# Patient Record
Sex: FEMALE | Race: OTHER | NOT HISPANIC OR LATINO | URBAN - METROPOLITAN AREA
[De-identification: names, ages, dates, MRNs, and addresses within clinical notes are randomized per-mention and may not be internally consistent; named-entity substitution may affect disease eponyms.]

---

## 2022-04-05 ENCOUNTER — EMERGENCY (EMERGENCY)
Facility: HOSPITAL | Age: 67
LOS: 1 days | Discharge: HOME | End: 2022-04-05
Attending: EMERGENCY MEDICINE | Admitting: INTERNAL MEDICINE
Payer: MEDICAID

## 2022-04-05 VITALS
WEIGHT: 132.28 LBS | RESPIRATION RATE: 16 BRPM | SYSTOLIC BLOOD PRESSURE: 118 MMHG | DIASTOLIC BLOOD PRESSURE: 78 MMHG | OXYGEN SATURATION: 96 % | HEART RATE: 78 BPM | HEIGHT: 63 IN | TEMPERATURE: 98 F

## 2022-04-05 PROCEDURE — 99284 EMERGENCY DEPT VISIT MOD MDM: CPT

## 2022-04-05 PROCEDURE — 73562 X-RAY EXAM OF KNEE 3: CPT | Mod: 26,LT

## 2022-04-05 PROCEDURE — 73562 X-RAY EXAM OF KNEE 3: CPT

## 2022-04-05 PROCEDURE — 99283 EMERGENCY DEPT VISIT LOW MDM: CPT | Mod: 25

## 2022-04-05 RX ORDER — IBUPROFEN 200 MG
400 TABLET ORAL ONCE
Refills: 0 | Status: COMPLETED | OUTPATIENT
Start: 2022-04-05 | End: 2022-04-05

## 2022-04-05 RX ADMIN — Medication 400 MILLIGRAM(S): at 11:38

## 2022-04-05 NOTE — ED PROVIDER NOTE - OBJECTIVE STATEMENT
Dr. De Jesus: 66F Mongolian speaking, declined , wants daughter at bedside to translate, sustained a mechanical fall at home on 5 hardwood steps, held on to banister to break her fall, did not hit her head, no LOC, twisted left knee, able to ambulate but with pain. No other injuries. Recd motrin in the ED with some relief.

## 2022-04-05 NOTE — ED PROVIDER NOTE - NSFOLLOWUPCLINICS_GEN_ALL_ED_FT
[facility] Orthopedic Surgery  Orthopedic Surgery  [address], 3rd & 4th [place] [address]  Phone: [phone]  Fax:

## 2022-04-05 NOTE — ED PROVIDER NOTE - CLINICAL SUMMARY MEDICAL DECISION MAKING FREE TEXT BOX
Pt with left knee pain, likely sprain, given knee immobilizer, ace wrap, pain ctrl, dc with ortho f/u Pt with left knee pain, likely sprain, given knee immobilizer, ace wrap, pain ctrl, dc with ortho f/u    Appt made for  April 27th at 9am  [facility] at [address]

## 2022-04-05 NOTE — ED ADULT NURSE NOTE - NSIMPLEMENTINTERV_GEN_ALL_ED
Implemented All Fall Risk Interventions:  [place] to call system. Call bell, personal items and telephone within reach. Instruct patient to call for assistance. Room bathroom lighting operational. Non-slip footwear when patient is off stretcher. Physically safe environment: no spills, clutter or unnecessary equipment. Stretcher in lowest position, wheels locked, appropriate side rails in place. Provide visual cue, wrist band, yellow gown, etc. Monitor gait and stability. Monitor for mental status changes and reorient to person, place, and time. Review medications for side effects contributing to fall risk. Reinforce activity limits and safety measures with patient and family.

## 2022-04-05 NOTE — ED PROVIDER NOTE - PATIENT PORTAL LINK FT
You can access the FollowMyHealth Patient Portal offered by [facility] by registering at the following website: [email or web address]. By joining [employer]’s FollowMyHealth portal, you will also be able to view your health information using other applications (apps) compatible with our system.

## 2022-04-05 NOTE — CHART NOTE - NSCHARTNOTEFT_GEN_A_CORE
Pt presenting to  ED on 4/5 s/p a fall down the stairs. Pt is Icelandic speaking only. Pt's dtr Neelam at bedside who Pt prefers to translate. Pt refusing  services.   Pt lives at home with her dtr Neelam. Pt reports they ambulate and perform all ADLs independently without assistive devices. Pt has no hx of falls and does not utilize home care services.   Pt tripped and fell down stairs today resulting in L knee pain. Pt's XRAYS performed in ED are clear. Pt to be dc home with knee immobilizer and crutches. Pt's dtr to assist at home.   Pt is undocumented and uninsured. Pt does not have any outpatient medical care in place. Pt's dtr states Pt was signed up for Emergency Medicaid services. DAWSON informed dtr that Emergency Medicaid will cover inpatient hospital care, but will not provide coverage for outpatient medical appointments. SW discussed the option of visiting a sliding scale clinic for lower cost private pay care. Pt's dtr understanding and agreeable.   DAWSON scheduled a new Pt PCP appointment at [facility] for Friday, April 8, at 1pm.  FP informed SW that Pt will need to bring a photo ID and the appointment will be $5. DAWSON provided information to Pt and dtr. Pt and dtr understanding.  DAWSON scheduled an orthopedic follow-up appointment at [facility] at [place] for April 27th at 9am, [address], 3rd floor, [facility]. Pt and dtr agreeable to appointment date and time.  DAWSON provided Pt and dtr with financial assistance number of [phone] and directed dtr to call prior to Orthopedic appointment to be screened for sliding scale rate. Pt's dtr understanding and states she will call prior to appointment.   Pt to be transported home by dtr

## 2022-04-05 NOTE — ED PROVIDER NOTE - NS ED ATTENDING STATEMENT MOD
This was a shared visit with the JENELLE. I reviewed and verified the documentation and independently performed the documented:

## 2022-04-05 NOTE — ED ADULT NURSE NOTE - OBJECTIVE STATEMENT
Pt presents to ED from home with daughter for left knee pain. Pt states she fell down 5 steps and landed on her wrists and knees. She c/o left knee pain and difficulty ambulating. No obvious deformity.

## 2022-04-05 NOTE — ED PROVIDER NOTE - CARE PROVIDER_API CALL
Jatin Covington)  Orthopaedic Surgery  [address]  [place] [address]  Phone: [phone]  Fax: [phone]  Follow Up Time:

## 2022-04-05 NOTE — ED PROVIDER NOTE - ATTENDING CONTRIBUTION TO CARE
Dr. De Jesus: I performed a face to face bedside interview with patient regarding history of present illness, review of symptoms and past medical history. I completed an independent physical exam.  I have discussed patient's plan of care with PA.   I agree with note as stated above, having amended the EMR as needed to reflect my findings.   This includes HISTORY OF PRESENT ILLNESS, HIV, PAST MEDICAL/SURGICAL/FAMILY/SOCIAL HISTORY, ALLERGIES AND HOME MEDICATIONS, REVIEW OF SYSTEMS, PHYSICAL EXAM, and any PROGRESS NOTES during the time I functioned as the attending physician for this patient.    Dr. De Jesus: This H&P has been written by myself in its entirety

## 2022-04-05 NOTE — ED PROVIDER NOTE - NSFOLLOWUPINSTRUCTIONS_ED_ALL_ED_FT
-Motrin 400mg every 6 hours on a full stomach as needed for pain  -Follow up with ortho in 1-2 days  -Knee immobilizer during the day for comfort. You can also use ace wrap  -Return to the ED for worsening pain or any concerns      Knee Sprain, Adult       A knee sprain is a stretch or tear in a knee ligament. Knee ligaments are tissues that connect bones in the knee to each other.      What are the causes?    This condition often results from:  •A fall.      •An injury to the knee.        What are the signs or symptoms?    Symptoms of this condition include:  •Trouble straightening or bending the leg.      •Swelling in the knee.      •Bruising around the knee.      •Tenderness or pain in the knee.      •Muscle spasms around the knee.        How is this diagnosed?    This condition may be diagnosed based on:  •A physical exam.      •A history of what happened just before you started to have symptoms.    •Tests, including:  •An X-ray. This may be done to make sure no bones are broken.      •An MRI. This may be done to check if the ligament is torn.      •Stress testing of the knee. This may be done to check ligament damage.          How is this treated?    Treatment for this condition may involve:  •Keeping the knee still (immobilized) with a cast, brace, or splint.      •Applying ice to the knee. This helps with pain and swelling.      •Raising (elevating) the knee above the level of your heart when you are resting. This helps with pain and swelling.      •Taking medicine for pain.      •Doing exercises to prevent or limit permanent weakness or stiffness in your knee.      •Having surgery to reconnect the ligament to the bone or to reconstruct it. This may be needed if the ligament is completely torn.        Follow these instructions at home:    If you have a splint or brace:     •Wear it as told by your health care provider. Remove it only as told by your health care provider.      •Check the skin around it every day. Tell your health care provider about any concerns.      •Loosen it if your toes tingle, become numb, or turn cold and blue.      •Keep it clean and dry.      If you have a cast:     • Do not stick anything inside it to scratch your skin. Doing that increases your risk of infection.      •Check the skin around it every day. Tell your health care provider about any concerns.      •You may put lotion on dry skin around the edges of the cast. Do not put lotion on the skin underneath the cast.      •Keep it clean and dry.      Bathing     If you have a splint, brace, or cast that is not waterproof:  •Do not let it get wet.      •Cover it with a watertight covering when you take a bath or a shower.        Managing pain, stiffness, and swelling  •If directed, put ice on the injured area. To do this:  •If you have a removable splint or brace, remove it as told by your health care provider.      •Put ice in a plastic bag.      •Place a towel between your skin and the bag or between your cast and the bag.      •Leave the ice on for 20 minutes, 2–3 times a day.        •Move your toes often to reduce stiffness and swelling.      •Elevate the injured area above the level of your heart while you are sitting or lying down.      General instructions    •Take over-the-counter and prescription medicines only as told by your health care provider.      •Do not use any products that contain nicotine or tobacco, such as cigarettes, e-cigarettes, and chewing tobacco. These can delay healing. If you need help quitting, ask your health care provider.      •Do exercises as told by your health care provider.      •Keep all follow-up visits as told by your health care provider. This is important.        Contact a health care provider if:    •You have pain that gets worse.      •The cast, brace, or splint does not fit right.      •The cast, brace, or splint gets damaged.        Get help right away if:    •You cannot use your injured knee to support any of your body weight (cannot bear weight).      •You cannot move the injured joint.      •You cannot walk more than a few steps without pain or without your knee buckling.      •You have significant pain, swelling, or numbness in the leg below the cast, brace, or splint.      •Your foot or toes are numb, cold, or blue after loosening your splint or brace.        Summary    •A knee sprain is a stretch or tear in a knee ligament that usually occurs as the result of a fall or injury.      •Treatment may involve immobilizing the knee with a cast, splint, or brace and then doing exercises.      •If the ligament is completely torn, it may require surgery to repair or replace the injured ligament.      This information is not intended to replace advice given to you by your health care provider. Make sure you discuss any questions you have with your health care provider.    *****  Fall Prevention for Older Adults    WHAT YOU NEED TO KNOW:    As you age, your muscles weaken and your risk for falls increases. Your risk also increases if you take medicines that make you sleepy or dizzy. You may also be at risk if you have vision or joint problems, have low blood pressure, or are not active.    DISCHARGE INSTRUCTIONS:    Call 911 or have someone else call if:   •You have fallen and are unconscious.      •You have fallen and cannot move part of your body.      Contact your healthcare provider if:   •You have fallen and have pain or a headache.      •You have questions or concerns about your condition or care.      Fall prevention tips:   •Stay active. Exercise can help strengthen your muscles and improve your balance. Your healthcare provider may recommend water aerobics, walking, or Neil Chi. He or she may also recommend physical therapy to improve your coordination. Never start an exercise program without asking your healthcare provider first.  Water Aerobics for Seniors       Neil Chi for Seniors           •Wear shoes that fit well and have soles that . Wear shoes both inside and outside. Use slippers with good . Avoid shoes with high heels.      •Use assistive devices as directed. Your healthcare provider may suggest that you use a cane or walker to help you keep your balance. You may need to have grab bars put in your bathroom near the toilet or in the shower.      •Stand or sit up slowly. This may help you keep your balance and prevent falls.      •Wear a personal alarm. This is a device that allows you to call 911 if you need help. Ask for more information on personal alarms.      •Manage your medical conditions.  Keep all appointments with your healthcare providers. Visit your eye doctor as directed.      Home safety tips:     Fall Prevention for Seniors     •Add items to prevent falls in the bathroom. Put nonslip strips on your bath or shower floor to prevent you from slipping. Use a bath mat if you do not have carpet in the bathroom. This will prevent you from falling when you step out of the bath or shower. Use a shower seat so you do not need to stand while you shower. Sit on the toilet or a chair in your bathroom to dry yourself and put on clothing. This will prevent you from losing your balance from drying or dressing yourself while you are standing.      •Keep paths clear. Remove books, shoes, and other objects from walkways and stairs. Place cords for telephones and lamps out of the way so that you do not need to walk over them. Tape them down if you cannot move them. Remove small rugs. If you cannot remove a rug, secure it with double-sided tape. This will prevent you from tripping.      •Install bright lights in your home. Use night lights to help light paths to the bathroom or kitchen. Always turn on the light before you start walking.      •Keep items you use often on shelves within reach. Do not use a step stool to help you reach an item.      •Paint or place reflective tape on the edges of your stairs. This will help you see the stairs better.      Follow up with your healthcare provider as directed: Write down your questions so you remember to ask them during your visits.

## 2022-04-06 PROBLEM — Z00.00 ENCOUNTER FOR PREVENTIVE HEALTH EXAMINATION: Status: ACTIVE | Noted: 2022-04-06

## 2022-04-07 PROBLEM — Z78.9 OTHER SPECIFIED HEALTH STATUS: Chronic | Status: ACTIVE | Noted: 2022-04-05

## 2022-04-08 ENCOUNTER — APPOINTMENT (OUTPATIENT)
Dept: FAMILY MEDICINE | Facility: HOSPITAL | Age: 67
End: 2022-04-08

## 2022-04-08 NOTE — HISTORY OF PRESENT ILLNESS
[FreeTextEntry8] :  65 yo F presents after being discharged from ED on 4/5/22, she presented after sustaining a mechanical fall at home on 5 hardwood steps, held on to banister to break fall, did not hit her head, no LOC, twisted L knee. Knee immobilizer was placed. \par

## 2022-04-12 NOTE — CHART NOTE - NSCHARTNOTEFT_GEN_A_CORE
SW placed call to patient to discuss and assist with follow up care.  Patient presented to ED on 4/5/22 with complaint of fall down stairs. Pt did not answer. SW left a voicemail encouraging Pt to call with further questions and/or follow-up needs.

## 2022-04-26 ENCOUNTER — APPOINTMENT (OUTPATIENT)
Dept: FAMILY MEDICINE | Facility: HOSPITAL | Age: 67
End: 2022-04-26

## 2022-04-26 ENCOUNTER — OUTPATIENT (OUTPATIENT)
Dept: OUTPATIENT SERVICES | Facility: HOSPITAL | Age: 67
LOS: 1 days | End: 2022-04-26
Payer: SELF-PAY

## 2022-04-26 VITALS
WEIGHT: 139 LBS | OXYGEN SATURATION: 99 % | HEIGHT: 64 IN | BODY MASS INDEX: 23.73 KG/M2 | DIASTOLIC BLOOD PRESSURE: 85 MMHG | RESPIRATION RATE: 12 BRPM | TEMPERATURE: 98.6 F | HEART RATE: 74 BPM | SYSTOLIC BLOOD PRESSURE: 125 MMHG

## 2022-04-26 DIAGNOSIS — Z00.00 ENCOUNTER FOR GENERAL ADULT MEDICAL EXAMINATION WITHOUT ABNORMAL FINDINGS: ICD-10-CM

## 2022-04-26 DIAGNOSIS — M25.50 PAIN IN UNSPECIFIED JOINT: ICD-10-CM
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
## 2022-04-26 PROCEDURE — G0463: CPT

## 2022-04-26 RX ORDER — DICLOFENAC SODIUM 1% 10 MG/G
1 GEL TOPICAL DAILY
Qty: 1 | Refills: 0 | Status: ACTIVE | COMMUNITY
Start: 2022-04-26 | End: 1900-01-01

## 2022-04-27 ENCOUNTER — APPOINTMENT (OUTPATIENT)
Dept: ORTHOPEDIC SURGERY | Facility: HOSPITAL | Age: 67
End: 2022-04-27

## 2022-04-27 ENCOUNTER — OUTPATIENT (OUTPATIENT)
Dept: OUTPATIENT SERVICES | Facility: HOSPITAL | Age: 67
LOS: 1 days | End: 2022-04-27
Payer: SELF-PAY

## 2022-04-27 VITALS
RESPIRATION RATE: 14 BRPM | TEMPERATURE: 98.6 F | DIASTOLIC BLOOD PRESSURE: 87 MMHG | SYSTOLIC BLOOD PRESSURE: 148 MMHG | BODY MASS INDEX: 22.21 KG/M2 | HEART RATE: 70 BPM | HEIGHT: 64 IN | WEIGHT: 130.07 LBS

## 2022-04-27 DIAGNOSIS — M25.562 PAIN IN LEFT KNEE: ICD-10-CM

## 2022-04-27 DIAGNOSIS — M79.609 PAIN IN UNSPECIFIED LIMB: ICD-10-CM

## 2022-04-27 PROCEDURE — G0463: CPT

## 2022-04-27 RX ORDER — MELOXICAM 15 MG/1
15 TABLET ORAL DAILY
Qty: 30 | Refills: 0 | Status: ACTIVE | COMMUNITY
Start: 2022-04-27 | End: 1900-01-01

## 2022-04-27 RX ORDER — MELOXICAM 15 MG/1
1 TABLET ORAL
Qty: 30 | Refills: 0
Start: 2022-04-27 | End: 2022-05-26

## 2022-04-27 NOTE — PHYSICAL EXAM
[Normal] : no acute distress, well nourished, well developed and well-appearing [de-identified] : TTP of medial L knee, antalgic gait noted, walks w/o flexing L knee. Pain worse w valgus stress test

## 2022-04-27 NOTE — HISTORY OF PRESENT ILLNESS
[Other: ______] : provided by KADY [FreeTextEntry8] : Pt is a 67 yo Turkmen speaking F presenting for post-ED F/U. \par -Pt presented to ED in beginning of April after a mechanical fall down 5 steps, states she held on to railing, did not hit head, twisted L knee medially on way down. In ED, PE notable for minimal L knee effusion and TTP over medial L knee. X-ray obtained in ED demonstrated no fracture or bony pathology. Pt was given a knee immobilizer and told to F/U in clinic and given an Ortho F/U. Pt has appt w Ortho tomorrow 04/27. \par -Today, states improvement of L knee pain, uses ibuprofen sparingly. Admits to not using knee immobilizer, states oversized and difficult to get into car w it. Avoids flexing knee due to pain. Does not use a walking device at baseline, states she has never required one, states slipped down stairs.  [Interpreters_IDNumber] : [id number] [TWNoteComboBox1] : Palestinian

## 2022-04-27 NOTE — REVIEW OF SYSTEMS
[Joint Pain] : joint pain [Negative] : Constitutional [Joint Stiffness] : no joint stiffness [Joint Swelling] : no joint swelling [Muscle Weakness] : no muscle weakness [Muscle Pain] : no muscle pain [Back Pain] : no back pain

## 2022-04-29 ENCOUNTER — APPOINTMENT (OUTPATIENT)
Dept: MRI IMAGING | Facility: HOSPITAL | Age: 67
End: 2022-04-29

## 2022-04-29 ENCOUNTER — OUTPATIENT (OUTPATIENT)
Dept: OUTPATIENT SERVICES | Facility: HOSPITAL | Age: 67
LOS: 1 days | End: 2022-04-29
Payer: SELF-PAY

## 2022-04-29 DIAGNOSIS — M25.562 PAIN IN LEFT KNEE: ICD-10-CM

## 2022-04-29 PROCEDURE — 73721 MRI JNT OF LWR EXTRE W/O DYE: CPT

## 2022-05-24 ENCOUNTER — APPOINTMENT (OUTPATIENT)
Dept: FAMILY MEDICINE | Facility: HOSPITAL | Age: 67
End: 2022-05-24

## 2022-05-25 ENCOUNTER — APPOINTMENT (OUTPATIENT)
Dept: ORTHOPEDIC SURGERY | Facility: HOSPITAL | Age: 67
End: 2022-05-25

## 2022-05-25 ENCOUNTER — OUTPATIENT (OUTPATIENT)
Dept: OUTPATIENT SERVICES | Facility: HOSPITAL | Age: 67
LOS: 1 days | End: 2022-05-25
Payer: SELF-PAY

## 2022-05-25 VITALS
HEART RATE: 72 BPM | TEMPERATURE: 96.8 F | DIASTOLIC BLOOD PRESSURE: 86 MMHG | BODY MASS INDEX: 22.36 KG/M2 | SYSTOLIC BLOOD PRESSURE: 134 MMHG | HEIGHT: 64 IN | WEIGHT: 131 LBS

## 2022-05-25 DIAGNOSIS — M79.609 PAIN IN UNSPECIFIED LIMB: ICD-10-CM

## 2022-05-25 PROCEDURE — G0463: CPT

## 2022-05-25 RX ORDER — MELOXICAM 15 MG/1
15 TABLET ORAL DAILY
Qty: 30 | Refills: 1 | Status: ACTIVE | COMMUNITY
Start: 2022-05-25 | End: 1900-01-01

## 2022-05-26 DIAGNOSIS — M23.322 OTHER MENISCUS DERANGEMENTS, POSTERIOR HORN OF MEDIAL MENISCUS, LEFT KNEE: ICD-10-CM

## 2022-05-26 DIAGNOSIS — M17.12 UNILATERAL PRIMARY OSTEOARTHRITIS, LEFT KNEE: ICD-10-CM

## 2022-07-13 ENCOUNTER — APPOINTMENT (OUTPATIENT)
Dept: ORTHOPEDIC SURGERY | Facility: HOSPITAL | Age: 67
End: 2022-07-13

## 2022-07-13 ENCOUNTER — OUTPATIENT (OUTPATIENT)
Dept: OUTPATIENT SERVICES | Facility: HOSPITAL | Age: 67
LOS: 1 days | End: 2022-07-13
Payer: SELF-PAY

## 2022-07-13 VITALS
BODY MASS INDEX: 22.36 KG/M2 | HEART RATE: 56 BPM | HEIGHT: 64 IN | RESPIRATION RATE: 14 BRPM | SYSTOLIC BLOOD PRESSURE: 137 MMHG | TEMPERATURE: 97.1 F | WEIGHT: 131 LBS | DIASTOLIC BLOOD PRESSURE: 85 MMHG

## 2022-07-13 DIAGNOSIS — M25.50 PAIN IN UNSPECIFIED JOINT: ICD-10-CM

## 2022-07-13 DIAGNOSIS — M23.322 OTHER MENISCUS DERANGEMENTS, POSTERIOR HORN OF MEDIAL MENISCUS, LEFT KNEE: ICD-10-CM

## 2022-07-13 DIAGNOSIS — M17.12 UNILATERAL PRIMARY OSTEOARTHRITIS, LEFT KNEE: ICD-10-CM

## 2022-07-13 PROCEDURE — G0463: CPT

## 2022-07-15 DIAGNOSIS — M23.322 OTHER MENISCUS DERANGEMENTS, POSTERIOR HORN OF MEDIAL MENISCUS, LEFT KNEE: ICD-10-CM

## 2022-07-15 DIAGNOSIS — M17.12 UNILATERAL PRIMARY OSTEOARTHRITIS, LEFT KNEE: ICD-10-CM

## 2022-10-12 ENCOUNTER — APPOINTMENT (OUTPATIENT)
Dept: ORTHOPEDIC SURGERY | Facility: HOSPITAL | Age: 67
End: 2022-10-12

## 2022-12-19 ENCOUNTER — EMERGENCY (EMERGENCY)
Facility: HOSPITAL | Age: 67
LOS: 1 days | Discharge: HOME | End: 2022-12-19
Attending: EMERGENCY MEDICINE | Admitting: EMERGENCY MEDICINE
Payer: MEDICAID

## 2022-12-19 VITALS
HEART RATE: 73 BPM | WEIGHT: 132.28 LBS | RESPIRATION RATE: 15 BRPM | DIASTOLIC BLOOD PRESSURE: 87 MMHG | HEIGHT: 63.78 IN | OXYGEN SATURATION: 94 % | TEMPERATURE: 97 F | SYSTOLIC BLOOD PRESSURE: 146 MMHG

## 2022-12-19 PROCEDURE — 99284 EMERGENCY DEPT VISIT MOD MDM: CPT

## 2022-12-19 PROCEDURE — 99283 EMERGENCY DEPT VISIT LOW MDM: CPT

## 2022-12-19 NOTE — ED ADULT NURSE NOTE - NSFALLRSKINDICATORS_ED_ALL_ED
Nurse Navigation called [facility] today to see if the patient has been established with Dr. Boyer.  The  stated that they have attempted to contact the patient over the last 3 months 3 times and have left messages on the number listed at [phone] with no return call back from the patient.  Nurse Navigator has attempted to connect wit  Agnieszka from home health she is scheduled to see patient today.  
no

## 2022-12-19 NOTE — CHART NOTE - NSCHARTNOTEFT_GEN_A_CORE
SW met with patient at bedside to assess for social work needs and to complete home assessment of safety.  Patient is a 67 year old female presenting to ED with complaint of cough. Patients daughter at bedside, patient granted permission for daughter to translate.  Daughter reports that patient lives at home with her and family.  Denied any safety concerns or need for SW resources at this time. ISAR negative - score of 0.  SW spoke with patient and daughter regarding follow up appointment with PMD, SW offered to schedule.  Patient in agreement.  SW called [facility] [phone] and scheduled patient follow up for 12/22/22 at 9 AM.  Patient made aware that she will need to get COVID test prior to entering (FP will provide test), daughter verbalized understanding.  Patient confirms having transportation home.  SW to remain available as needed.

## 2022-12-19 NOTE — ED PROVIDER NOTE - CLINICAL SUMMARY MEDICAL DECISION MAKING FREE TEXT BOX
67-year-old female no pertinent past medical history presents to the ED saying that she had flu.  However she has a lingering cough.  No more fever or body aches.  Patient would like a medication for the cough or an antibiotic.  No vomiting.  Cough is dry.  No shortness of breath.  No chest pain. Daughter is granted permission to translate by patient..  Exam within normal limits.  No acute findings on exam or concerns.  However note that patient's O2 sat at 94%.  Afebrile with normal heart rate and blood pressure.  Advised patient to get an x-ray and offered to send a swab for flu, COVID, RSV.  Patient refused x-ray.  She refused swab as well.  She had never been tested but she knew that she had the flu because her grandson had the same.  Patient only wants a medicine for the cough and an antibiotic.  Since patient is certain that she has something viral and her symptoms are improving and that she no longer has body aches or fever, it likely was viral and I do not recommend an antibiotic.  Will prescribe a medicine for the cough.  All questions answered.  Recommend follow-up with a primary care doctor if no improvement.  Return to the ER if there is worsening

## 2022-12-19 NOTE — ED PROVIDER NOTE - NSFOLLOWUPINSTRUCTIONS_ED_ALL_ED_FT
Please follow-up with your primary care doctor if there is no improvement.  You can call [phone] to help with setting up an appointment if you do not have a doctor.  We have prescribed benzonatate or Tessalon Perles 100 mg every 8 hours as needed for cough.  Return to the ER if there is trouble breathing chest pain or worsening of symptoms.

## 2022-12-19 NOTE — ED PROVIDER NOTE - PHYSICAL EXAMINATION
General:     NAD, well-nourished, well-appearing  Eyes: PERRL  Head:     NC/AT, EOMI, oral mucosa moist  Neck:     trachea midline  Lungs:     CTA b/l  CVS:     RRR  Abd:     +BS, s/nt/nd  Ext:   no deformities   Neuro: AAOx3, no sensory/motor deficits  Skin: No rash or injuries noted

## 2022-12-19 NOTE — ED PROVIDER NOTE - PATIENT PORTAL LINK FT
You can access the FollowMyHealth Patient Portal offered by [facility] by registering at the following website: [email or web address]. By joining [employer]’s FollowMyHealth portal, you will also be able to view your health information using other applications (apps) compatible with our system.

## 2022-12-19 NOTE — ED PROVIDER NOTE - OBJECTIVE STATEMENT
67-year-old female no pertinent past medical history presents to the ED saying that she had flu.  However she has a lingering cough.  No more fever or body aches.  Patient would like a medication for the cough or an antibiotic.  No vomiting.  Cough is dry.  No shortness of breath.  No chest pain. Daughter is granted permission to translate by patient.

## 2022-12-19 NOTE — ED ADULT NURSE NOTE - NS ED NURSE LEVEL OF CONSCIOUSNESS SPEECH
Ambulatory Care Coordination Note  6/3/2021  CM Risk Score: 2  Charlson 10 Year Mortality Risk Score: 98%     ACC: Milla Mendez RN    Summary Note:   Spoke with patient for [facility] ER f/u. Identified patient by name and . Oriented patient to the role of ACM. Patient is agreeable to enrollment. Patient reports that she is SOB on exertion, weak and has continued cough yielding dark, \"streaked\" blood tinged sputum. Patient reports slight decrease in cough, less abdominal pain than when she went to the ER. Patient denies CP, dizziness, fever, light headedness, palpitations or increased cough. Denies recent fall or injury; bloody sputum less than one teaspoon. Denies fever, chills, N/V/D or worsening symptoms. Instructed on worsening s/s  to report to MD. DOVE to update provider in r/t status. Reviewed medications. Confirmed that patient has Vibra Tabs/ prednisone and is taking them as directed. Instructed patient to complete the entire course of her antibiotics. Patient verbalized understanding. Patient reports that she does struggle with the cost of her medication co-pays. Agreeable to referral for [facility] support. Discussed the benefits of [address] for safety/ medication/ disease management/  RN on call for changes in condition. Patient reports that she would benefit from [address] support at this time. Patient reports that her grand son is staying with her to assist with her care as needed. Reports that her daughter is also supportive. Denies any transportation barriers. Patient denies any concerns at this time. ACM contact information provided should needs arise. Spoke with PCP office/ MA. Updated in r/t patient status/ request for [address]. F/u appt. Scheduled 21 @ 08:45. Updated Sophie/ [address] [place] [address] Rd in r/t potential referral.     Follow up contact with patient to confirm appointment date and time. Instructed on the [facility] as a bridge to Primary care for urgent care needs.   Reviewed s/s to report to [facility]. Plan for next outreach:  [id number] [address]    Ambulatory Care Coordination Assessment    Care Coordination Protocol  Program Enrollment: Complex Care  Referral from Primary Care Provider: No  Week 1 - Initial Assessment     Do you have all of your prescriptions and are they filled?: Yes  Barriers to medication adherence: Other  Other barriers to medication adherence: Overwhelmed at times  Are you able to afford your medications?: With Assistance  Medication Assistance Program: [facility] Meds/Vouchers  How often do you have trouble taking your medications the way you have been told to take them?: I always take them as prescribed. Do you have Home O2 Therapy?: No      Ability to seek help/take action for Emergent Urgent situations i.e. fire, crime, inclement weather or health crisis. : Independent  Ability to ambulate to restroom: Independent  Ability handle personal hygeine needs (bathing/dressing/grooming): Independent  Ability to manage Medications: Independent  Ability to prepare Food Preparation: Independent  Ability to maintain home (clean home, laundry): Needs Assistance  Ability to drive and/or has transportation: Independent  Ability to do shopping: Needs Assistance  Ability to manage finances:  Independent  Is patient able to live independently?: Yes     Current Housing: Private Residence        Per the Fall Risk Screening, did the patient have 2 or more falls or 1 fall with injury in the past year?: No     Frequent urination at night?: Yes  Do you use rails/bars?: No  Do you have a non-slip tub mat?: Yes     Are you experiencing loss of meaning?: No  Are you experiencing loss of hope and peace?: No     Thinking about your patient's physical health needs, are there any symptoms or problems (risk indicators) you are unsure about that require further investigation?: Mild vague physical symptoms or problems; but do not impact on daily life or are not of concern to patient   Are the patients physical health problems impacting on their mental well-being?: No identified areas of concern   Are there any problems with your patients lifestyle behaviors (alcohol, drugs, diet, exercise) that are impacting on physical or mental well-being?: No identified areas of concern   Do you have any other concerns about your patients mental well-being? How would you rate their severity and impact on the patient?: No identified areas of concern   How would you rate their home environment in terms of safety and stability (including domestic violence, insecure housing, neighbor harassment)?: Consistently safe, supportive, stable, no identified problems   How do daily activities impact on the patient's well-being? (include current or anticipated unemployment, work, caregiving, access to transportation or other): No identified problems or perceived positive benefits   How would you rate their social network (family, work, friends)?: Good participation with social networks   How would you rate their financial resources (including ability to afford all required medical care)?: Financially secure, some resource challenges   How wells does the patient now understand their health and well-being (symptoms, signs or risk factors) and what they need to do to manage their health?: Reasonable to good understanding and already engages in managing health or is willing to undertake better management   How well do you think your patient can engage in healthcare discussions? (Barriers include language, deafness, aphasia, alcohol or drug problems, learning difficulties, concentration): Adequate communication, with or without minor barriers   Do other services need to be involved to help this patient?: Other care/services not in place and required   Are current services involved with this patient well-coordinated?  (Include coordination with other services you are now recommendation): Required care/services missing and/or fragmented Ratna Auguste, DO [facility]   8/23/2021  2:00 PM MD KIRSTY Coker MRANGEDE Cazares Ran   11/15/2021  1:00 PM MD RICK BenjaminVKARL [facility]V NEPH     ,   Diabetes Assessment    Meal Planning: Carb counting, Avoidance of concentrated sweets   Do you have barriers with adherence to non-pharmacologic self-management interventions?  (Nutrition/Exercise/Self-Monitoring): No   Have you ever had to go to the ED for symptoms of low blood sugar?: No          ,   COPD Assessment    Does the patient understand envrionmental exposure?: Yes  Is the patient able to verbalize Rescue vs. Long Acting medications?: Yes  Does the patient have a nebulizer?: No  Does the patient use a space with inhaled medications?: No     Shortness of breath (worse than baseline)         Symptoms:     Breathlessness: minimal exertion  Increase use of rapid acting/rescue inhaled medications?: No  Sputum characteristics: Blood-tinged  Have you had a recent diagnosis of pneumonia either by PCP or at a hospital?: No      and   General Assessment    Do you have any symptoms that are causing concern?: Yes  Progression since Onset: Gradually Improving  Reported Symptoms: Shortness of Breath, Fatigue, Cough Speaking Coherently

## 2022-12-19 NOTE — ED PROVIDER NOTE - TEMPLATE, MLM
Problem: Patient Care Overview  Goal: Plan of Care Review  Outcome: Ongoing (interventions implemented as appropriate)      Problem: Renal Failure/Kidney Injury, Acute (Adult)  Goal: Signs and Symptoms of Listed Potential Problems Will be Absent, Minimized or Managed (Renal Failure/Kidney Injury, Acute)  Outcome: Ongoing (interventions implemented as appropriate)      Problem: Confusion, Acute (Adult)  Goal: Identify Related Risk Factors and Signs and Symptoms  Outcome: Ongoing (interventions implemented as appropriate)    Goal: Cognitive/Functional Impairments Minimized  Outcome: Ongoing (interventions implemented as appropriate)    Goal: Safety  Outcome: Ongoing (interventions implemented as appropriate)      Problem: Pain, Chronic (Adult)  Goal: Identify Related Risk Factors and Signs and Symptoms  Outcome: Ongoing (interventions implemented as appropriate)    Goal: Acceptable Pain/Comfort Level and Functional Ability  Outcome: Ongoing (interventions implemented as appropriate)         General

## 2022-12-19 NOTE — ED ADULT NURSE NOTE - OBJECTIVE STATEMENT
Pt came from home with c/o cough x 2 weeks. Pt states that 1 week ago she was dx with the flu and she has a lingering dry cough that she cant get rid of Pt. Pt denies SOB, fever/chills, n/v or any other symptoms. Pt requesting cough medicine at this time.

## 2022-12-22 ENCOUNTER — APPOINTMENT (OUTPATIENT)
Dept: FAMILY MEDICINE | Facility: HOSPITAL | Age: 67
End: 2022-12-22

## 2022-12-27 ENCOUNTER — NON-APPOINTMENT (OUTPATIENT)
Age: 67
End: 2022-12-27

## 2023-01-03 NOTE — CHART NOTE - NSCHARTNOTEFT_GEN_A_CORE
As per chart review, SW called [facility] [phone] and scheduled patient follow up for 12/22/22 at 9 AM.

## 2024-05-16 ENCOUNTER — NON-APPOINTMENT (OUTPATIENT)
Age: 69
End: 2024-05-16